# Patient Record
Sex: FEMALE | Race: WHITE | NOT HISPANIC OR LATINO | Employment: FULL TIME | URBAN - METROPOLITAN AREA
[De-identification: names, ages, dates, MRNs, and addresses within clinical notes are randomized per-mention and may not be internally consistent; named-entity substitution may affect disease eponyms.]

---

## 2019-12-11 ENCOUNTER — OFFICE VISIT (OUTPATIENT)
Dept: MEDICAL GROUP | Facility: LAB | Age: 34
End: 2019-12-11
Payer: COMMERCIAL

## 2019-12-11 VITALS
TEMPERATURE: 99.1 F | SYSTOLIC BLOOD PRESSURE: 108 MMHG | WEIGHT: 136.4 LBS | OXYGEN SATURATION: 99 % | BODY MASS INDEX: 22.73 KG/M2 | DIASTOLIC BLOOD PRESSURE: 62 MMHG | HEIGHT: 65 IN | HEART RATE: 82 BPM

## 2019-12-11 DIAGNOSIS — F41.9 ANXIETY AND DEPRESSION: ICD-10-CM

## 2019-12-11 DIAGNOSIS — F32.A ANXIETY AND DEPRESSION: ICD-10-CM

## 2019-12-11 DIAGNOSIS — F90.9 ATTENTION DEFICIT HYPERACTIVITY DISORDER (ADHD), UNSPECIFIED ADHD TYPE: ICD-10-CM

## 2019-12-11 DIAGNOSIS — R10.13 DYSPEPSIA: ICD-10-CM

## 2019-12-11 PROCEDURE — 99204 OFFICE O/P NEW MOD 45 MIN: CPT | Performed by: FAMILY MEDICINE

## 2019-12-11 RX ORDER — DEXTROAMPHETAMINE SACCHARATE, AMPHETAMINE ASPARTATE, DEXTROAMPHETAMINE SULFATE AND AMPHETAMINE SULFATE 5; 5; 5; 5 MG/1; MG/1; MG/1; MG/1
20 TABLET ORAL 2 TIMES DAILY
Qty: 60 EACH | Refills: 0 | Status: SHIPPED | OUTPATIENT
Start: 2020-02-18 | End: 2020-03-19

## 2019-12-11 RX ORDER — TRAZODONE HYDROCHLORIDE 50 MG/1
50 TABLET ORAL NIGHTLY PRN
Qty: 90 TAB | Refills: 3 | Status: SHIPPED | OUTPATIENT
Start: 2019-12-11

## 2019-12-11 RX ORDER — DEXTROAMPHETAMINE SACCHARATE, AMPHETAMINE ASPARTATE, DEXTROAMPHETAMINE SULFATE AND AMPHETAMINE SULFATE 5; 5; 5; 5 MG/1; MG/1; MG/1; MG/1
20 TABLET ORAL 2 TIMES DAILY
COMMUNITY
End: 2019-12-11 | Stop reason: SDUPTHER

## 2019-12-11 RX ORDER — DEXTROAMPHETAMINE SACCHARATE, AMPHETAMINE ASPARTATE, DEXTROAMPHETAMINE SULFATE AND AMPHETAMINE SULFATE 5; 5; 5; 5 MG/1; MG/1; MG/1; MG/1
20 TABLET ORAL 2 TIMES DAILY
Qty: 60 EACH | Refills: 0 | Status: SHIPPED | OUTPATIENT
Start: 2020-01-18 | End: 2020-02-17

## 2019-12-11 RX ORDER — FLUOXETINE HYDROCHLORIDE 40 MG/1
40 CAPSULE ORAL DAILY
COMMUNITY

## 2019-12-11 RX ORDER — FLUOXETINE HYDROCHLORIDE 20 MG/1
20 CAPSULE ORAL DAILY
COMMUNITY

## 2019-12-11 RX ORDER — TRAZODONE HYDROCHLORIDE 50 MG/1
50 TABLET ORAL NIGHTLY
COMMUNITY
End: 2019-12-11 | Stop reason: SDUPTHER

## 2019-12-11 RX ORDER — OMEPRAZOLE 20 MG/1
20 CAPSULE, DELAYED RELEASE ORAL DAILY
COMMUNITY

## 2019-12-11 RX ORDER — DEXTROAMPHETAMINE SACCHARATE, AMPHETAMINE ASPARTATE, DEXTROAMPHETAMINE SULFATE AND AMPHETAMINE SULFATE 5; 5; 5; 5 MG/1; MG/1; MG/1; MG/1
20 TABLET ORAL 2 TIMES DAILY
Qty: 60 EACH | Refills: 0 | Status: SHIPPED | OUTPATIENT
Start: 2019-12-18 | End: 2020-01-17

## 2019-12-11 RX ORDER — CETIRIZINE HYDROCHLORIDE 10 MG/1
10 TABLET, CHEWABLE ORAL NIGHTLY
COMMUNITY

## 2019-12-11 SDOH — HEALTH STABILITY: MENTAL HEALTH: HOW OFTEN DO YOU HAVE A DRINK CONTAINING ALCOHOL?: 2-4 TIMES A MONTH

## 2019-12-11 ASSESSMENT — ENCOUNTER SYMPTOMS
PALPITATIONS: 0
SORE THROAT: 0
DIZZINESS: 0
CONSTIPATION: 0
MYALGIAS: 0
CHILLS: 0
DIARRHEA: 0
ABDOMINAL PAIN: 0
WHEEZING: 0
NAUSEA: 0
FOCAL WEAKNESS: 0
FEVER: 0
HEADACHES: 0
SHORTNESS OF BREATH: 0
VOMITING: 0
COUGH: 0
BLURRED VISION: 0

## 2019-12-11 ASSESSMENT — PATIENT HEALTH QUESTIONNAIRE - PHQ9: CLINICAL INTERPRETATION OF PHQ2 SCORE: 0

## 2019-12-11 NOTE — PROGRESS NOTES
Winsome Gamble is a 34 y.o. female here to establish care and discuss chronic conditions and med refills.    HPI:        ADHD  On adderall since 2015 after diagnosis. Currently taking 2x daily when at work.  Helps with symptoms.    Is the medication improving the patient’s symptoms: Yes  Any adverse effects: No    Alcohol or illicit drug use:   She  reports current alcohol use.  She  reports no history of drug use.     History of controlled substance used in a way other than prescribed? No  Any early refills of a controlled substance: No  History of lost or stolen controlled substance prescription: No  Any aberrant behavior or intoxication while on a controlled substance: No  Has the patient self-modified their dose or frequency of the medication :No  Compliant with treatment recommendations and plan: Yes  Any major health change to the patient: No  Concerns for misuse, abuse or addiction: No    /NarxCheck report reviewed: Yes  History of abnormal drug screening: No     Depression/anxiety  Takes 60 mg Prozac daily for 5+ years. This has helped with mood. Is doing veterinary surgical internship right now, high stress.  Takes trazodone nightly for sleep.    GERD  Takes 20 mg omeprazole when first wakes up. On for ~1 year.  Had EGD in 2010, gastric empyting study, swallow study -all non-concerning.  Thinks she is seeing improvement with PPI but still has continued occasional epigastric pain, has resulted in ER visits. No trouble swallowing, no bloody or dark stools.      Current medicines (including changes today)  Current Outpatient Medications   Medication Sig Dispense Refill   • fluoxetine (PROZAC) 40 MG capsule Take 40 mg by mouth every day.     • FLUoxetine (PROZAC) 20 MG Cap Take 20 mg by mouth every day.     • cetirizine (ZYRTEC) 10 MG chewable tablet Take 10 mg by mouth every evening.     • omeprazole (PRILOSEC) 20 MG delayed-release capsule Take 20 mg by mouth every day.     • Biotin 5000 MCG Cap  Take  by mouth.     • Cyanocobalamin (B-12 PO) Take  by mouth.     • Probiotic Product (PROBIOTIC-10 PO) Take  by mouth.     • [START ON 12/18/2019] amphetamine-dextroamphetamine (ADDERALL) 20 MG Tab Take 1 Tab by mouth 2 times a day for 30 days. 60 Each 0   • traZODone (DESYREL) 50 MG Tab Take 1 Tab by mouth at bedtime as needed for Sleep. 90 Tab 3   • [START ON 1/18/2020] amphetamine-dextroamphetamine (ADDERALL) 20 MG Tab Take 1 Tab by mouth 2 times a day for 30 days. 60 Each 0   • [START ON 2/18/2020] amphetamine-dextroamphetamine (ADDERALL) 20 MG Tab Take 1 Tab by mouth 2 times a day for 30 days. 60 Each 0     No current facility-administered medications for this visit.      She  has a past medical history of Anxiety, Depression, and Migraine.  She  has a past surgical history that includes abdominal exploration.  Social History     Tobacco Use   • Smoking status: Never Smoker   • Smokeless tobacco: Never Used   Substance Use Topics   • Alcohol use: Yes     Frequency: 2-4 times a month   • Drug use: Never     Social History     Patient does not qualify to have social determinant information on file (likely too young).   Social History Narrative    Works as a .     Family History   Problem Relation Age of Onset   • Thyroid Mother    • No Known Problems Father    • Thyroid Sister    • No Known Problems Brother    • Asthma Sister      No family status information on file.       ROS  Review of Systems   Constitutional: Negative for chills and fever.   HENT: Negative for congestion and sore throat.    Eyes: Negative for blurred vision.   Respiratory: Negative for cough, shortness of breath and wheezing.    Cardiovascular: Negative for chest pain and palpitations.   Gastrointestinal: Negative for abdominal pain, constipation, diarrhea, nausea and vomiting.   Genitourinary: Negative for dysuria and urgency.   Musculoskeletal: Negative for joint pain and myalgias.   Skin: Negative for rash.   Neurological:  "Negative for dizziness, focal weakness and headaches.   All other systems reviewed and are negative.        Objective:     Physical Exam:  /62 (BP Location: Left arm, Patient Position: Sitting)   Pulse 82   Temp 37.3 °C (99.1 °F) (Temporal)   Ht 1.651 m (5' 5\")   Wt 61.9 kg (136 lb 6.4 oz)   SpO2 99%  Body mass index is 22.7 kg/m².  Constitutional: Alert. Well appearing. No distress.  Skin: Warm, dry, good turgor, no visible rashes.  Eye: Equal, round and reactive to light, conjunctiva clear, lids normal.  ENMT: Moist mucous membranes. Normal dentition. Oropharynx clear.  Respiratory: Normal effort. Lungs are clear to auscultation bilaterally.  Cardiovascular: Regular rate and rhythm. Normal S1/S2. No murmurs, rubs or gallops.   Abdomen: Soft, non-tender, non-distended. No masses, no hepatosplenomegaly.  Neuro: Moves all four extremities. No facial droop.  Psych: Answers questions appropriately. Normal affect and mood.      Assessment and Plan:   1. Attention deficit hyperactivity disorder (ADHD), unspecified ADHD type  Chronic issue, stable.  Doing well with Adderall and this is refilled today for 3 months.  No significant side effects reported.  She will be due for refill on or after March 19, 2020.   reviewed.  UDS collected.  Follow-up 3 months.  - amphetamine-dextroamphetamine (ADDERALL) 20 MG Tab; Take 1 Tab by mouth 2 times a day for 30 days.  Dispense: 60 Each; Refill: 0  - amphetamine-dextroamphetamine (ADDERALL) 20 MG Tab; Take 1 Tab by mouth 2 times a day for 30 days.  Dispense: 60 Each; Refill: 0  - amphetamine-dextroamphetamine (ADDERALL) 20 MG Tab; Take 1 Tab by mouth 2 times a day for 30 days.  Dispense: 60 Each; Refill: 0  - PAIN MANAGEMENT SCRN, UR; Future    2. Anxiety and depression  Chronic issue, stable.  Continuing on Prozac 60 mg daily and trazodone nightly.  She does plan to establish with a counselor in [facility].  - traZODone (DESYREL) 50 MG Tab; Take 1 Tab by mouth at bedtime as " needed for Sleep.  Dispense: 90 Tab; Refill: 3    3. Dyspepsia  Chronic issue, stable.  Has had work-up in the past for reflux and epigastric pain.  Negative EGD in 2010.  Also had reassuring gastric emptying study and swallow study.  Currently on omeprazole and this may have improved symptoms.  No red flag symptoms.  Continuing omeprazole for now, if symptoms change or worsen could consider referral to GI.      Records requested from previous PCP.  Follow up: Return in about 3 months (around 3/11/2020) for befreo 3/19 for med refill.         PLEASE NOTE: This note was created using voice recognition software.

## 2020-03-25 ENCOUNTER — OFFICE VISIT (OUTPATIENT)
Dept: MEDICAL GROUP | Facility: LAB | Age: 35
End: 2020-03-25
Payer: COMMERCIAL

## 2020-03-25 DIAGNOSIS — F90.9 ATTENTION DEFICIT HYPERACTIVITY DISORDER (ADHD), UNSPECIFIED ADHD TYPE: ICD-10-CM

## 2020-03-25 PROCEDURE — 99441 PR PHYSICIAN TELEPHONE EVALUATION 5-10 MIN: CPT | Mod: CR | Performed by: FAMILY MEDICINE

## 2020-03-25 RX ORDER — DEXTROAMPHETAMINE SACCHARATE, AMPHETAMINE ASPARTATE, DEXTROAMPHETAMINE SULFATE AND AMPHETAMINE SULFATE 5; 5; 5; 5 MG/1; MG/1; MG/1; MG/1
20 TABLET ORAL 2 TIMES DAILY
Qty: 60 EACH | Refills: 0 | Status: SHIPPED | OUTPATIENT
Start: 2020-04-24 | End: 2020-05-04 | Stop reason: SDUPTHER

## 2020-03-25 RX ORDER — DEXTROAMPHETAMINE SACCHARATE, AMPHETAMINE ASPARTATE, DEXTROAMPHETAMINE SULFATE AND AMPHETAMINE SULFATE 5; 5; 5; 5 MG/1; MG/1; MG/1; MG/1
20 TABLET ORAL 2 TIMES DAILY
Qty: 60 TAB | Refills: 0 | Status: SHIPPED | OUTPATIENT
Start: 2020-03-25 | End: 2020-05-04 | Stop reason: SDUPTHER

## 2020-03-25 RX ORDER — DEXTROAMPHETAMINE SACCHARATE, AMPHETAMINE ASPARTATE, DEXTROAMPHETAMINE SULFATE AND AMPHETAMINE SULFATE 5; 5; 5; 5 MG/1; MG/1; MG/1; MG/1
20 TABLET ORAL 2 TIMES DAILY
Qty: 60 EACH | Refills: 0 | Status: SHIPPED | OUTPATIENT
Start: 2020-05-24 | End: 2020-05-04 | Stop reason: SDUPTHER

## 2020-03-25 NOTE — PROGRESS NOTES
Telephone Appointment Visit   As a means of avoiding spread of COVID-19, this visit is being conducted by telephone. This telephone visit was initiated by the patient and they verbally consented.    Time at start of call: 1:55    Reason for Call:  Medication Follow-up    Patient Comments / History:   Cristine is a 34-year-old female with history of ADHD who is following up for medication management.  Takes Adderall 20 mg twice daily.  She works as a  and this has been helping with concentration.  Does not take every day.  No issues with sleep, agitation, or decreased appetite.  No early refills.    Labs / Images Reviewed   None    Assessment and Plan:     1. Attention deficit hyperactivity disorder (ADHD), unspecified ADHD type  Chronic issue, stable.  Doing well on Adderall without adverse effects.  [place]  reviewed.  Refills for 3 months provided today.  Next refill due 6/23/2020.  - amphetamine-dextroamphetamine (ADDERALL) 20 MG Tab; Take 1 Tab by mouth 2 times a day for 30 days.  Dispense: 60 Tab; Refill: 0  - amphetamine-dextroamphetamine (ADDERALL) 20 MG Tab; Take 1 Tab by mouth 2 times a day for 30 days.  Dispense: 60 Each; Refill: 0  - amphetamine-dextroamphetamine (ADDERALL) 20 MG Tab; Take 1 Tab by mouth 2 times a day for 30 days.  Dispense: 60 Each; Refill: 0    Follow-up: Return in about 3 months (around 6/25/2020).    Time at end of call: 2:00  Total Time Spent: 5-10 minutes    Jersey Lynn M.D.

## 2020-05-04 DIAGNOSIS — F90.9 ATTENTION DEFICIT HYPERACTIVITY DISORDER (ADHD), UNSPECIFIED ADHD TYPE: ICD-10-CM

## 2020-05-04 NOTE — TELEPHONE ENCOUNTER
Received request via: PATIENT/ Never picked up and now they are .    20  Was the patient seen in the last year in this department? Yes  3/25/20  Does the patient have an active prescription (recently filled or refills available) for medication(s) requested? No

## 2020-05-05 RX ORDER — DEXTROAMPHETAMINE SACCHARATE, AMPHETAMINE ASPARTATE, DEXTROAMPHETAMINE SULFATE AND AMPHETAMINE SULFATE 5; 5; 5; 5 MG/1; MG/1; MG/1; MG/1
20 TABLET ORAL 2 TIMES DAILY
Qty: 60 EACH | Refills: 0 | Status: SHIPPED | OUTPATIENT
Start: 2020-06-05 | End: 2020-07-05

## 2020-05-05 RX ORDER — DEXTROAMPHETAMINE SACCHARATE, AMPHETAMINE ASPARTATE, DEXTROAMPHETAMINE SULFATE AND AMPHETAMINE SULFATE 5; 5; 5; 5 MG/1; MG/1; MG/1; MG/1
20 TABLET ORAL 2 TIMES DAILY
Qty: 60 EACH | Refills: 0 | Status: SHIPPED | OUTPATIENT
Start: 2020-07-05 | End: 2020-08-04

## 2020-05-05 RX ORDER — DEXTROAMPHETAMINE SACCHARATE, AMPHETAMINE ASPARTATE, DEXTROAMPHETAMINE SULFATE AND AMPHETAMINE SULFATE 5; 5; 5; 5 MG/1; MG/1; MG/1; MG/1
20 TABLET ORAL 2 TIMES DAILY
Qty: 60 EACH | Refills: 0 | Status: SHIPPED | OUTPATIENT
Start: 2020-05-05 | End: 2020-06-04